# Patient Record
Sex: MALE | Race: OTHER | HISPANIC OR LATINO | URBAN - METROPOLITAN AREA
[De-identification: names, ages, dates, MRNs, and addresses within clinical notes are randomized per-mention and may not be internally consistent; named-entity substitution may affect disease eponyms.]

---

## 2021-08-09 ENCOUNTER — EMERGENCY (EMERGENCY)
Facility: HOSPITAL | Age: 51
LOS: 1 days | Discharge: HOME | End: 2021-08-09
Attending: EMERGENCY MEDICINE
Payer: MEDICAID

## 2021-08-09 VITALS
SYSTOLIC BLOOD PRESSURE: 164 MMHG | HEART RATE: 80 BPM | RESPIRATION RATE: 19 BRPM | WEIGHT: 268.96 LBS | DIASTOLIC BLOOD PRESSURE: 89 MMHG | HEIGHT: 69.29 IN | OXYGEN SATURATION: 98 % | TEMPERATURE: 98 F

## 2021-08-09 DIAGNOSIS — Z98.890 OTHER SPECIFIED POSTPROCEDURAL STATES: Chronic | ICD-10-CM

## 2021-08-09 PROCEDURE — 99284 EMERGENCY DEPT VISIT MOD MDM: CPT

## 2021-08-09 PROCEDURE — 72131 CT LUMBAR SPINE W/O DYE: CPT | Mod: 26,MA

## 2021-08-09 PROCEDURE — 72131 CT LUMBAR SPINE W/O DYE: CPT | Mod: MA

## 2021-08-09 PROCEDURE — 99284 EMERGENCY DEPT VISIT MOD MDM: CPT | Mod: 25

## 2021-08-09 RX ORDER — DIAZEPAM 5 MG
2 TABLET ORAL
Qty: 10 | Refills: 0
Start: 2021-08-09

## 2021-08-09 RX ORDER — IBUPROFEN 200 MG
600 TABLET ORAL ONCE
Refills: 0 | Status: COMPLETED | OUTPATIENT
Start: 2021-08-09 | End: 2021-08-09

## 2021-08-09 RX ORDER — DIAZEPAM 5 MG
5 TABLET ORAL ONCE
Refills: 0 | Status: DISCONTINUED | OUTPATIENT
Start: 2021-08-09 | End: 2021-08-09

## 2021-08-09 RX ADMIN — Medication 5 MILLIGRAM(S): at 21:06

## 2021-08-09 RX ADMIN — Medication 600 MILLIGRAM(S): at 21:07

## 2021-08-09 NOTE — ED PROVIDER NOTE - NSFOLLOWUPINSTRUCTIONS_ED_ALL_ED_FT
IMPORTANT INSTRUCTIONS FROM Dr. LOCKWOOD:    Please follow up with your personal medical doctor in 24-48 hours.   Bring results from today to your visit.    Take  tylenol or acetaminophen  for pain.  You can also take valium for muscle relaxant additionally if this is not enough.     If you were advised to take any medications - be sure to review the package insert.    If your symptoms change, get worse or if you have any new symptoms, come to the ER right away.  If you have any questions, call the ER at the phone number on this page.

## 2021-08-09 NOTE — ED PROVIDER NOTE - CLINICAL SUMMARY MEDICAL DECISION MAKING FREE TEXT BOX
No red flag for back pain other than hardware from 2 years ago. At patient's request will do CT scan to check for loosening of hardware, although unlikely. Will give pain control and potentially discharge.

## 2021-08-09 NOTE — ED PROVIDER NOTE - MUSCULOSKELETAL, MLM
Scar to the back. No midline spinal tenderness to palpation, mild R paraspinal tenderness to palpation, 5/5 EHL, sensory intact in lower extremities, negative straight leg raise

## 2021-08-09 NOTE — ED PROVIDER NOTE - PROGRESS NOTE DETAILS
feeling better and wants to go home, reviewed case and results w pt, questions answered and pt understands, advised return precautions and care plan. pt has appt 8/19 w spine. advised him to see his pain management doctor sooner than that.

## 2021-08-09 NOTE — ED PROVIDER NOTE - OBJECTIVE STATEMENT
50 y/o male comes in with R sided lower back pain x 1.5 weeks. Reports he had back surgery in 2019 so he called the back surgeon (Dr. Efren Bell [place]) but was unable to see him because the doctor is on vacation so the  told him to go to the ER. Patient states he always gets back pain but this time it has been more than usual. Patient is worried the screws are loose in his back. No numbness or tingling. Ambulates with a cane at baseline. No fever. No IV drug use. No trauma. Did note his back first started hurting when he stepped on uneven pavement but did not fall at that time.

## 2021-08-09 NOTE — ED PROVIDER NOTE - PATIENT PORTAL LINK FT
You can access the FollowMyHealth Patient Portal offered by [facility] by registering at the following website: [email or web address]. By joining [facility]’s FollowMyHealth portal, you will also be able to view your health information using other applications (apps) compatible with our system.

## 2021-12-21 NOTE — ED PROVIDER NOTE - CHIEF COMPLAINT
Oswaldo stated she is suffering from severe Sciatic nerve pain and chest pain. Patient was given an appointment with Dr. Burch office for 01/14/2021, pt doesn't understand why there going to wait so long. Patient would like to speak with nurse regarding matter. Please call    The patient is a 51y Male complaining of pain, low back.
